# Patient Record
Sex: MALE | Race: WHITE | NOT HISPANIC OR LATINO | Employment: UNEMPLOYED | URBAN - NONMETROPOLITAN AREA
[De-identification: names, ages, dates, MRNs, and addresses within clinical notes are randomized per-mention and may not be internally consistent; named-entity substitution may affect disease eponyms.]

---

## 2024-02-06 ENCOUNTER — HOSPITAL ENCOUNTER (OUTPATIENT)
Dept: GENERAL RADIOLOGY | Facility: HOSPITAL | Age: 34
Discharge: HOME | End: 2024-02-06
Admitting: NURSE PRACTITIONER
Payer: COMMERCIAL

## 2024-02-06 ENCOUNTER — TRANSCRIBE ORDERS (OUTPATIENT)
Dept: GENERAL RADIOLOGY | Facility: HOSPITAL | Age: 34
End: 2024-02-06

## 2024-02-06 DIAGNOSIS — M54.42 ACUTE BACK PAIN WITH SCIATICA, LEFT: Primary | ICD-10-CM

## 2024-02-06 DIAGNOSIS — G89.29 CHRONIC IDIOPATHIC ANAL PAIN: ICD-10-CM

## 2024-02-06 DIAGNOSIS — M54.41 ACUTE BACK PAIN WITH SCIATICA, RIGHT: ICD-10-CM

## 2024-02-06 DIAGNOSIS — M54.42 ACUTE BACK PAIN WITH SCIATICA, LEFT: ICD-10-CM

## 2024-02-06 DIAGNOSIS — K62.89 CHRONIC IDIOPATHIC ANAL PAIN: ICD-10-CM

## 2024-02-06 PROCEDURE — 72100 X-RAY EXAM L-S SPINE 2/3 VWS: CPT

## 2024-04-09 RX ORDER — IBUPROFEN 600 MG/1
TABLET ORAL
COMMUNITY
Start: 2024-01-30 | End: 2024-04-10

## 2024-04-09 RX ORDER — ROPINIROLE 2 MG/1
2 TABLET, FILM COATED, EXTENDED RELEASE ORAL DAILY
COMMUNITY
Start: 2024-02-05 | End: 2024-04-10

## 2024-04-09 RX ORDER — PROMETHAZINE HYDROCHLORIDE 25 MG/1
TABLET ORAL
COMMUNITY
Start: 2024-01-30 | End: 2024-04-10

## 2024-04-09 RX ORDER — CLONIDINE HYDROCHLORIDE 0.1 MG/1
TABLET ORAL
COMMUNITY
Start: 2024-01-30 | End: 2024-04-10

## 2024-04-09 RX ORDER — CHOLECALCIFEROL (VITAMIN D3) 125 MCG
CAPSULE ORAL
COMMUNITY
Start: 2024-01-30 | End: 2024-04-10

## 2024-04-09 RX ORDER — BUPRENORPHINE HYDROCHLORIDE AND NALOXONE HYDROCHLORIDE DIHYDRATE 8; 2 MG/1; MG/1
TABLET SUBLINGUAL
COMMUNITY
Start: 2024-01-31

## 2024-04-09 RX ORDER — METHOCARBAMOL 750 MG/1
TABLET, FILM COATED ORAL
COMMUNITY
Start: 2024-02-05 | End: 2024-04-10

## 2024-04-09 RX ORDER — NAPROXEN 500 MG/1
TABLET ORAL
COMMUNITY
Start: 2024-02-05 | End: 2024-04-10

## 2024-04-09 RX ORDER — SERTRALINE HYDROCHLORIDE 25 MG/1
TABLET, FILM COATED ORAL
COMMUNITY
Start: 2024-02-01 | End: 2024-04-10

## 2024-04-09 RX ORDER — TIZANIDINE 2 MG/1
TABLET ORAL
COMMUNITY
Start: 2024-01-30 | End: 2024-04-10

## 2024-04-09 RX ORDER — HYDROXYZINE HYDROCHLORIDE 25 MG/1
TABLET, FILM COATED ORAL
COMMUNITY
Start: 2024-01-31 | End: 2024-04-10

## 2024-04-10 ENCOUNTER — LAB (OUTPATIENT)
Age: 34
End: 2024-04-10
Payer: COMMERCIAL

## 2024-04-10 ENCOUNTER — OFFICE VISIT (OUTPATIENT)
Age: 34
End: 2024-04-10
Payer: COMMERCIAL

## 2024-04-10 VITALS
OXYGEN SATURATION: 97 % | HEART RATE: 72 BPM | SYSTOLIC BLOOD PRESSURE: 122 MMHG | BODY MASS INDEX: 26.71 KG/M2 | DIASTOLIC BLOOD PRESSURE: 82 MMHG | WEIGHT: 190.8 LBS | HEIGHT: 71 IN

## 2024-04-10 DIAGNOSIS — Z13.29 SCREENING FOR THYROID DISORDER: ICD-10-CM

## 2024-04-10 DIAGNOSIS — Z13.0 SCREENING FOR DEFICIENCY ANEMIA: ICD-10-CM

## 2024-04-10 DIAGNOSIS — Z13.1 SCREENING FOR DIABETES MELLITUS: ICD-10-CM

## 2024-04-10 DIAGNOSIS — E55.9 VITAMIN D DEFICIENCY: ICD-10-CM

## 2024-04-10 DIAGNOSIS — Z00.00 ROUTINE GENERAL MEDICAL EXAMINATION AT A HEALTH CARE FACILITY: ICD-10-CM

## 2024-04-10 DIAGNOSIS — Z13.1 SCREENING FOR DIABETES MELLITUS: Primary | ICD-10-CM

## 2024-04-10 DIAGNOSIS — M79.605 LEFT LEG PAIN: ICD-10-CM

## 2024-04-10 DIAGNOSIS — Z13.220 SCREENING FOR LIPID DISORDERS: ICD-10-CM

## 2024-04-10 DIAGNOSIS — M79.604 PAIN IN BOTH LOWER EXTREMITIES: ICD-10-CM

## 2024-04-10 DIAGNOSIS — E55.9 AVITAMINOSIS D: ICD-10-CM

## 2024-04-10 DIAGNOSIS — M79.604 RIGHT LEG PAIN: ICD-10-CM

## 2024-04-10 DIAGNOSIS — Z11.59 NEED FOR HEPATITIS C SCREENING TEST: ICD-10-CM

## 2024-04-10 DIAGNOSIS — M79.604 PAIN IN BOTH LOWER EXTREMITIES: Primary | ICD-10-CM

## 2024-04-10 DIAGNOSIS — Z13.0 SCREENING FOR IRON DEFICIENCY ANEMIA: ICD-10-CM

## 2024-04-10 DIAGNOSIS — F41.8 ANXIETY WITH DEPRESSION: ICD-10-CM

## 2024-04-10 DIAGNOSIS — M79.605 PAIN IN BOTH LOWER EXTREMITIES: ICD-10-CM

## 2024-04-10 DIAGNOSIS — Z11.59 SCREENING EXAMINATION FOR POLIOMYELITIS: ICD-10-CM

## 2024-04-10 DIAGNOSIS — Z00.00 WELLNESS EXAMINATION: ICD-10-CM

## 2024-04-10 DIAGNOSIS — Z13.220 SCREENING FOR LIPOID DISORDERS: ICD-10-CM

## 2024-04-10 DIAGNOSIS — M79.605 PAIN IN BOTH LOWER EXTREMITIES: Primary | ICD-10-CM

## 2024-04-10 LAB
25(OH)D3 SERPL-MCNC: 31.3 NG/ML (ref 30–100)
ALBUMIN SERPL-MCNC: 4.6 G/DL (ref 3.5–5.2)
ALBUMIN/GLOB SERPL: 1.9 G/DL
ALP SERPL-CCNC: 95 U/L (ref 39–117)
ALT SERPL W P-5'-P-CCNC: 22 U/L (ref 1–41)
ANION GAP SERPL CALCULATED.3IONS-SCNC: 12.5 MMOL/L (ref 5–15)
AST SERPL-CCNC: 15 U/L (ref 1–40)
BILIRUB SERPL-MCNC: 0.2 MG/DL (ref 0–1.2)
BUN SERPL-MCNC: 8 MG/DL (ref 6–20)
BUN/CREAT SERPL: 8.5 (ref 7–25)
CALCIUM SPEC-SCNC: 9.5 MG/DL (ref 8.6–10.5)
CHLORIDE SERPL-SCNC: 106 MMOL/L (ref 98–107)
CHOLEST SERPL-MCNC: 126 MG/DL (ref 0–200)
CHROMATIN AB SERPL-ACNC: <10 IU/ML (ref 0–14)
CO2 SERPL-SCNC: 24.5 MMOL/L (ref 22–29)
CREAT SERPL-MCNC: 0.94 MG/DL (ref 0.76–1.27)
CRP SERPL-MCNC: <0.3 MG/DL (ref 0–0.5)
DEPRECATED RDW RBC AUTO: 45.1 FL (ref 37–54)
EGFRCR SERPLBLD CKD-EPI 2021: 110.5 ML/MIN/1.73
ERYTHROCYTE [DISTWIDTH] IN BLOOD BY AUTOMATED COUNT: 12.7 % (ref 12.3–15.4)
ERYTHROCYTE [SEDIMENTATION RATE] IN BLOOD: 4 MM/HR (ref 0–15)
GLOBULIN UR ELPH-MCNC: 2.4 GM/DL
GLUCOSE SERPL-MCNC: 80 MG/DL (ref 65–99)
HCT VFR BLD AUTO: 48.9 % (ref 37.5–51)
HCV AB SER DONR QL: NORMAL
HDLC SERPL-MCNC: 40 MG/DL (ref 40–60)
HGB BLD-MCNC: 16.4 G/DL (ref 13–17.7)
LDLC SERPL CALC-MCNC: 65 MG/DL (ref 0–100)
LDLC/HDLC SERPL: 1.58 {RATIO}
MCH RBC QN AUTO: 32.3 PG (ref 26.6–33)
MCHC RBC AUTO-ENTMCNC: 33.5 G/DL (ref 31.5–35.7)
MCV RBC AUTO: 96.3 FL (ref 79–97)
PLATELET # BLD AUTO: 248 10*3/MM3 (ref 140–450)
PMV BLD AUTO: 11 FL (ref 6–12)
POTASSIUM SERPL-SCNC: 4.3 MMOL/L (ref 3.5–5.2)
PROT SERPL-MCNC: 7 G/DL (ref 6–8.5)
RBC # BLD AUTO: 5.08 10*6/MM3 (ref 4.14–5.8)
SODIUM SERPL-SCNC: 143 MMOL/L (ref 136–145)
TRIGL SERPL-MCNC: 115 MG/DL (ref 0–150)
TSH SERPL DL<=0.05 MIU/L-ACNC: 1.4 UIU/ML (ref 0.27–4.2)
URATE SERPL-MCNC: 5.4 MG/DL (ref 3.4–7)
VLDLC SERPL-MCNC: 21 MG/DL (ref 5–40)
WBC NRBC COR # BLD AUTO: 11.65 10*3/MM3 (ref 3.4–10.8)

## 2024-04-10 PROCEDURE — 85652 RBC SED RATE AUTOMATED: CPT | Performed by: NURSE PRACTITIONER

## 2024-04-10 PROCEDURE — 86140 C-REACTIVE PROTEIN: CPT | Performed by: NURSE PRACTITIONER

## 2024-04-10 PROCEDURE — 80050 GENERAL HEALTH PANEL: CPT | Performed by: NURSE PRACTITIONER

## 2024-04-10 PROCEDURE — 86431 RHEUMATOID FACTOR QUANT: CPT | Performed by: NURSE PRACTITIONER

## 2024-04-10 PROCEDURE — 36415 COLL VENOUS BLD VENIPUNCTURE: CPT | Performed by: NURSE PRACTITIONER

## 2024-04-10 PROCEDURE — 84550 ASSAY OF BLOOD/URIC ACID: CPT | Performed by: NURSE PRACTITIONER

## 2024-04-10 PROCEDURE — 82306 VITAMIN D 25 HYDROXY: CPT | Performed by: NURSE PRACTITIONER

## 2024-04-10 PROCEDURE — 80061 LIPID PANEL: CPT | Performed by: NURSE PRACTITIONER

## 2024-04-10 PROCEDURE — 86803 HEPATITIS C AB TEST: CPT | Performed by: NURSE PRACTITIONER

## 2024-04-10 RX ORDER — DESVENLAFAXINE 25 MG/1
25 TABLET, EXTENDED RELEASE ORAL DAILY
Qty: 30 TABLET | Refills: 0 | Status: SHIPPED | OUTPATIENT
Start: 2024-04-10

## 2024-04-10 RX ORDER — DESVENLAFAXINE 25 MG/1
25 TABLET, EXTENDED RELEASE ORAL DAILY
COMMUNITY
Start: 2024-02-09 | End: 2024-04-10 | Stop reason: SDUPTHER

## 2024-04-10 NOTE — PROGRESS NOTES
"Chief Complaint   Patient presents with    Leg Pain     Bilateral, stabbing pain, burning pain. At worse 8/10 on pain scale. Sitting makes it worse    Anxiety    Depression       Subjective   Shaggy Kapoor is a 33 y.o. 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 He has been having some bilateral leg pain that goes from his hips down to his feet and some numbness he also has back pain he denies any injuries or accidents that would cause the issues. He also complains of some restless leg issues at night and would like medicine to help with that.     4/2/2024- [facility] ER visit- Patient presents emergency department stating that for the past 1 year off-and-on he has had bilateral lower extremity pain described as \"growing pains, \"as well as pins-and-needles. Patient denies fever vomiting diarrhea dysuria saddle anesthesia, denies numbness in lower extremities, denies trauma or bowel or urinary incontinence, remains ambulatory, denies taking any medications today. Patient is requesting note for school today. Patient smokes but denies alcohol or recreational drugs, denies past medical surgical history or allergies to medicines. Patient states that pain is descending and stops around the ankle.     4/10/2024- Patient in to establish care, Patient denies any injury, has back pain, and has had this for couple years.  He did [facility] " above and was given muscle relaxers, naproxen and nothing helped.  He also was given some ropinirole for restless legs and this did not help either.  He also does have anxiety/depression issues.  He has been out of his medications for this for about a month.  Pristiq 25 mg.  He reports this did help some. Has a history of alcohol and drug issues, Has been clean about 6 months.     No Known Allergies  Past Medical History:   Diagnosis Date    Anxiety     Depression       History reviewed. No pertinent surgical history.  Social History     Socioeconomic History    Marital status: Single   Tobacco Use    Smoking status: Every Day     Current packs/day: 1.00     Average packs/day: 1 pack/day for 7.3 years (7.3 ttl pk-yrs)     Types: Cigarettes     Start date: 2017    Smokeless tobacco: Former     Types: Chew   Vaping Use    Vaping status: Never Used   Substance and Sexual Activity    Alcohol use: Not Currently    Drug use: Not Currently     Types: Methamphetamines    Sexual activity: Not Currently     Partners: Female        Current Outpatient Medications:     buprenorphine-naloxone (SUBOXONE) 8-2 MG per SL tablet, , Disp: , Rfl:     Desvenlafaxine Succinate ER 25 MG tablet sustained-release 24 hour, Take 1 tablet by mouth Daily., Disp: 30 tablet, Rfl: 0     Review of Systems   Constitutional:  Negative for chills, fatigue, fever and unexpected weight change.   Respiratory:  Negative for cough, chest tightness, shortness of breath and wheezing.    Cardiovascular:  Negative for chest pain, palpitations and leg swelling.   Gastrointestinal:  Negative for constipation, diarrhea, nausea and vomiting.   Genitourinary:  Negative for difficulty urinating and dysuria.   Musculoskeletal:  Positive for arthralgias (legs).   Skin:  Negative for color change and rash.   Neurological:  Positive for numbness. Negative for dizziness, syncope, weakness and headaches.   Psychiatric/Behavioral:  Negative for sleep disturbance.         Objective     Vitals:    04/10/24 0903   BP: 122/82   Pulse: 72   SpO2: 97%         04/10/24  0903   Weight: 86.5 kg (190 lb 12.8 oz)     Body mass index is 26.61 kg/m².  No results found for this or any previous visit.    Physical Exam  Vitals reviewed.   Constitutional:       Appearance: He is well-developed.   HENT:      Head: Normocephalic and atraumatic.   Cardiovascular:      Rate and Rhythm: Normal rate and regular rhythm.      Heart sounds: Normal heart sounds.   Pulmonary:      Effort: Pulmonary effort is normal.      Breath sounds: Normal breath sounds.   Musculoskeletal:         General: Tenderness (more in right lower leg than left, present in bilat) present.   Skin:     General: Skin is warm and dry.   Neurological:      Mental Status: He is alert and oriented to person, place, and time.      Sensory: No sensory deficit.      Motor: No weakness.      Deep Tendon Reflexes: Reflexes normal.   Psychiatric:         Behavior: Behavior normal.         Assessment & Plan   Problems Addressed this Visit    None  Visit Diagnoses       Pain in both lower extremities    -  Primary    Relevant Orders    XR Spine Lumbar 4+ View (In Office)    EMG & Nerve Conduction Test    C-reactive Protein    Rheumatoid Factor    Uric Acid    Sedimentation Rate    Anxiety with depression        Relevant Medications    Desvenlafaxine Succinate ER 25 MG tablet sustained-release 24 hour    Other Relevant Orders    Ambulatory Referral to Psychiatry    Screening for diabetes mellitus        Relevant Orders    Comprehensive Metabolic Panel    Screening for lipid disorders        Relevant Orders    Lipid Panel    Need for hepatitis C screening test        Relevant Orders    Hepatitis C Antibody    Vitamin D deficiency        Relevant Orders    Vitamin D,25-Hydroxy    Screening for thyroid disorder        Relevant Orders    TSH Rfx On Abnormal To Free T4    Screening for deficiency anemia        Relevant Orders    CBC (No Diff)     Wellness examination        Relevant Orders    Comprehensive Metabolic Panel    Lipid Panel    TSH Rfx On Abnormal To Free T4    CBC (No Diff)    Hepatitis C Antibody    C-reactive Protein    Rheumatoid Factor    Uric Acid    Sedimentation Rate          Diagnoses         Codes Comments    Pain in both lower extremities    -  Primary ICD-10-CM: M79.604, M79.605  ICD-9-CM: 729.5     Anxiety with depression     ICD-10-CM: F41.8  ICD-9-CM: 300.4     Screening for diabetes mellitus     ICD-10-CM: Z13.1  ICD-9-CM: V77.1     Screening for lipid disorders     ICD-10-CM: Z13.220  ICD-9-CM: V77.91     Need for hepatitis C screening test     ICD-10-CM: Z11.59  ICD-9-CM: V73.89     Vitamin D deficiency     ICD-10-CM: E55.9  ICD-9-CM: 268.9     Screening for thyroid disorder     ICD-10-CM: Z13.29  ICD-9-CM: V77.0     Screening for deficiency anemia     ICD-10-CM: Z13.0  ICD-9-CM: V78.1     Wellness examination     ICD-10-CM: Z00.00  ICD-9-CM: V70.0         Will obtain routine labs today and make further recommendations pending results. All lab results are automatically released to [employer] immediately when they return whether they have been reviewed or not. The patient will be notified about the results, regardless of the findings. If they have not been contacted by the office within 2 weeks after the test has been performed, I want them to contact us to learn about the results.    For his current symptoms we will obtain labs, x-ray of his lower back as well as obtain an EMG.  I will make further recommendations pending outcome of this test.    For depression/anxiety we will put him back on Pristiq 25 mg daily for 1 month and then we will increase it to 50 mg daily.    I, Lanre BRITO, personally performed the services described in this documentation, as scribed by ALISHA Hughes student, in my presence, and is both accurate and complete.       Time spent on visit, including counseling, education, reviewing the chart,  and any recent test results, was   30     Minutes    Return visit in 1 month    Counseling provided on mental health concerns.    Lanre Brooke Pratik, APRN   4/10/2024   09:36 EDT     Please note that portions of this document were completed with a voice recognition program.     At [facility], we believe that sharing information builds trust and better relationships. You are receiving this note because you are receiving care at [facility] or have recently visited. It is possible you will see health information before a provider has talked with you about it. This kind of information can be easy to misunderstand as it is a medical document. It is intended as [id number] communication. It is written in medical language and may contain abbreviations or verbiage that are unfamiliar. It may appear blunt or direct. Medical documents are intended to carry relevant information, facts as evident, and the clinical opinion of the practitioner.  To help you fully understand what it means for your health, we urge you to discuss this note with your provider.

## 2024-04-16 ENCOUNTER — TELEMEDICINE (OUTPATIENT)
Dept: FAMILY MEDICINE CLINIC | Facility: TELEHEALTH | Age: 34
End: 2024-04-16
Payer: COMMERCIAL

## 2024-04-16 DIAGNOSIS — K52.9 GASTROENTERITIS: Primary | ICD-10-CM

## 2024-04-16 PROCEDURE — 99213 OFFICE O/P EST LOW 20 MIN: CPT | Performed by: NURSE PRACTITIONER

## 2024-04-16 PROCEDURE — 1160F RVW MEDS BY RX/DR IN RCRD: CPT | Performed by: NURSE PRACTITIONER

## 2024-04-16 PROCEDURE — 1159F MED LIST DOCD IN RCRD: CPT | Performed by: NURSE PRACTITIONER

## 2024-04-16 RX ORDER — ONDANSETRON 8 MG/1
8 TABLET, ORALLY DISINTEGRATING ORAL EVERY 8 HOURS PRN
Qty: 15 TABLET | Refills: 0 | Status: SHIPPED | OUTPATIENT
Start: 2024-04-16

## 2024-04-16 NOTE — PROGRESS NOTES
NEO Kapoor is a 33 y.o. male  presents with complaint of symptoms starting this morning including N/V/D and body aches. Currently lives in sober living home and it has been going around in other residents. Needs excuse for class for today and tomorrow. Has been able to drink Sprite but has not had an appetite today.    Review of Systems    Past Medical History:   Diagnosis Date    Anxiety     Depression        Family History   Problem Relation Age of Onset    Hypertension Mother     Hypertension Maternal Grandfather        Social History     Socioeconomic History    Marital status: Single   Tobacco Use    Smoking status: Every Day     Current packs/day: 1.00     Average packs/day: 1 pack/day for 7.3 years (7.3 ttl pk-yrs)     Types: Cigarettes     Start date: 2017    Smokeless tobacco: Former     Types: Chew   Vaping Use    Vaping status: Never Used   Substance and Sexual Activity    Alcohol use: Not Currently    Drug use: Not Currently     Types: Methamphetamines    Sexual activity: Not Currently     Partners: Female         There were no vitals taken for this visit.    PHYSICAL EXAM  Physical Exam   Constitutional: He appears well-developed and well-nourished.   HENT:   Head: Normocephalic.   Nose: Nose normal.   Neck: Neck normal appearance.  Pulmonary/Chest: Effort normal.   Neurological: He is alert.   Psychiatric: He has a normal mood and affect. His speech is normal.       Diagnoses and all orders for this visit:    1. Gastroenteritis (Primary)  -     ondansetron ODT (ZOFRAN-ODT) 8 MG disintegrating tablet; Place 1 tablet on the tongue Every 8 (Eight) Hours As Needed for Nausea or Vomiting.  Dispense: 15 tablet; Refill: 0          FOLLOW-UP  As discussed during visit with [facility] if symptoms worsen or fail to improve, follow-up with PCP/Urgent Care/Emergency Department.    Patient verbalizes understanding of medications, instructions for treatment and follow-up.    Avril Angel,  ALISHA  04/16/2024  13:02 EDT    The use of a video visit has been reviewed with the patient and verbal informed consent has been obtained. Myself and Shaggy Kapoor participated in this visit. The patient is located in [place], [place], and I am located in [place], [place]. [employer] and [employer] were utilized.

## 2024-04-16 NOTE — LETTER
April 16, 2024     Patient: Shaggy Kapoor   YOB: 1990   Date of Visit: 4/16/2024       To Whom It May Concern:    It is my medical opinion that Shaggy Kapoor  should be excused from class today and tomorrow .           Sincerely,        ALISHA Mojica    CC:   No Recipients

## 2024-04-23 ENCOUNTER — TELEMEDICINE (OUTPATIENT)
Dept: PSYCHIATRY | Facility: CLINIC | Age: 34
End: 2024-04-23
Payer: COMMERCIAL

## 2024-04-23 DIAGNOSIS — F41.1 GENERALIZED ANXIETY DISORDER: ICD-10-CM

## 2024-04-23 DIAGNOSIS — F33.1 MODERATE EPISODE OF RECURRENT MAJOR DEPRESSIVE DISORDER: Primary | ICD-10-CM

## 2024-04-23 DIAGNOSIS — F51.05 INSOMNIA DUE TO MENTAL CONDITION: ICD-10-CM

## 2024-04-23 PROCEDURE — 1160F RVW MEDS BY RX/DR IN RCRD: CPT

## 2024-04-23 PROCEDURE — 90792 PSYCH DIAG EVAL W/MED SRVCS: CPT

## 2024-04-23 PROCEDURE — 1159F MED LIST DOCD IN RCRD: CPT

## 2024-04-23 RX ORDER — DESVENLAFAXINE SUCCINATE 50 MG/1
50 TABLET, EXTENDED RELEASE ORAL DAILY
Qty: 30 TABLET | Refills: 0 | Status: SHIPPED | OUTPATIENT
Start: 2024-04-23

## 2024-04-23 RX ORDER — MIRTAZAPINE 15 MG/1
15 TABLET, FILM COATED ORAL NIGHTLY
Qty: 30 TABLET | Refills: 0 | Status: SHIPPED | OUTPATIENT
Start: 2024-04-23

## 2024-04-23 NOTE — LETTER
[facility]  [address] [place] [address]  [phone]  Dept: [phone]    24    RE:   Patient Name:  Shaggy Kapoor   :  1990    To Whom It May Concern    Please excuse Shaggy from group this AM.                                                  He had a medical appointment at 9AM 2024.                         He is released to return  2024.                                                                         To be re-evaluated on: May 22nd, 2024.    If you have any questions please contact us at [phone].    Sincerely,        This document has been electronically signed by ALISHA Bunch  2024 09:39 EDT

## 2024-04-23 NOTE — PROGRESS NOTES
This provider is located at [place], [place]. The Patient is seen remotely using Video. Patient is being seen via telehealth and confirm that they are in a secure environment for this session. Patient is located in [place], [place] at his sober living facility. The patient's condition being diagnosed/treated is appropriate for telemedicine. Provider identified as Kodi Alejo as well as credentials APRN MSN PMHNP-BC.   The client/patient gave consent to be seen remotely, and when consent is given they understand that the consent allows for patient identifiable information to be sent to a third party as needed.  They may refuse to be seen remotely at any time. The electronic data is encrypted and password protected, and the patient has been advised of the potential risks to privacy not withstanding such measures.    Subjective     Shaggy Kapoor is a 33 y.o. male who presents today for initial evaluation     Chief Complaint: Depression, anxiety, and insomnia    History of Present Illness: This is the first encounter for this APRN with the patient.  Patient is a referral for evaluation and management of his depression, anxiety, and insomnia.  Patient reports that he has a history of abusing methamphetamine.  States he went into rehab in January and that is when he was diagnosed with depression and anxiety.  He states that he is now in a sober living facility in [place], [place].  States he has been sober for 3 to 4 weeks now.  States he felt like he has had depression and anxiety for many years, but had never sought any type of treatment.  He denies any history of any psychiatric hospitalizations.  Denies any history of suicide attempts.  Patient states that he is maternal grandfather, mother, father, and a brother all have been diagnosed with schizophrenia and bipolar disorder.  Patient has concerns that he may have also.  He states his drug of choice was methamphetamine.  States he has experienced hearing voices  in the past.  He is unsure if this is just from the methamphetamine or actual hallucinations from a mental health disorder.  Patient was asked about any history of any hypomania.  The patient states there had been times when he would get some big ideas, would fight people, and steal things even though he did not need them in the past.  Again, patient is unable to discern if this was while on drugs or not.  Made a plan with patient that we will hold off on any schizophrenia or bipolar diagnoses currently until he is sober for a longer period of time and also we get his depression and anxiety under control.  Patient agreeable to the plan.  Patient states he was started on Pristiq about a week ago.  He states he has not noticed much difference in his symptoms yet.  He states that he will feel depressed about 3 to 4 days/week currently.  Denies any hopelessness.  Denies any suicidal or homicidal ideation.  States his sleep is poor.  Only getting 2 to 3 hours of sleep per night.  He states some of this is from being in the sober living facility and new surroundings.  Appetite is up and down.  He states he has a lot of anxiety.  States he has a lot of worry, overthinking, and catastrophic Thinking.  He states crowds do increase his anxiety.  He will get short of breath and having increased heart rate when this happens.  He denies any paranoia.  States he is no longer taking the Suboxone as he did not want to be dependent on that.    The following portions of the patient's history were reviewed and updated as appropriate: allergies, current medications, past family history, past medical history, past social history, past surgical history and problem list.    Past Psychiatric History: See HPI for treatment history.  Denies any history of inpatient psychiatric hospitalizations.  Denies any history of suicide attempts.    Family Psychiatric History: Mother and father were drug addicts when patient was growing up.  Mother,  father, maternal grandfather, and brother all have schizophrenia and bipolar disorder.  No suicides among first-degree relatives.    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  He has also used cocaine, crack, and different types of pills over the years.  He states he was drinking alcohol in excess a few years ago up to 1/5 daily, but has not done that in a few years.  He has been clean since March 27.  Denies any marijuana use.    Past Medical History:  Past Medical History:   Diagnosis Date    Anxiety     Depression        Social History: Patient was born and raised in [place], [place].  He was raised by his mother and father even though they were drug addicts.  He denies any history of abuse.  He has 2 brothers and 1 sister.  He has a GED.  He is currently .  He has 3 kids by 3 different women.  He does have 3 assault charges ongoing currently.  Hobbies include video games, spending time with his kids, sports, and camping.  He is currently seeking employment.  Social History     Socioeconomic History    Marital status: Single   Tobacco Use    Smoking status: Every Day     Current packs/day: 1.00     Average packs/day: 1 pack/day for 7.3 years (7.3 ttl pk-yrs)     Types: Cigarettes     Start date: 2017    Smokeless tobacco: Former     Types: Chew   Vaping Use    Vaping status: Never Used   Substance and Sexual Activity    Alcohol use: Not Currently    Drug use: Not Currently     Types: Methamphetamines    Sexual activity: Not Currently     Partners: Female       Family History:  Family History   Problem Relation Age of Onset    Hypertension Mother     Hypertension Maternal Grandfather        Past Surgical History:  History reviewed. No pertinent surgical history.    Problem List:  Patient Active Problem List   Diagnosis    Moderate episode of recurrent major depressive disorder    Generalized anxiety disorder    Insomnia due to mental condition       Allergy:   No Known  Allergies     Current Medications:   Current Outpatient Medications   Medication Sig Dispense Refill    Desvenlafaxine Succinate ER (PRISTIQ) 50 MG 24 hr tablet Take 1 tablet by mouth Daily. 30 tablet 0    mirtazapine (Remeron) 15 MG tablet Take 1 tablet by mouth Every Night. 30 tablet 0     No current facility-administered medications for this visit.       Review of Symptoms:    Review of Systems   Constitutional: Negative.    HENT: Negative.     Eyes: Negative.    Respiratory: Negative.     Cardiovascular: Negative.    Gastrointestinal: Negative.    Endocrine: Negative.    Genitourinary: Negative.    Musculoskeletal: Negative.    Skin: Negative.    Allergic/Immunologic: Negative.    Neurological: Negative.    Hematological: Negative.    Psychiatric/Behavioral:  Positive for sleep disturbance and depressed mood. The patient is nervous/anxious.          Physical Exam:   There were no vitals taken for this visit.    Appearance: Normal  Gait, Station, Strength: Within normal limits    Mental Status Exam:   Hygiene:   good  Cooperation:  Cooperative  Eye Contact:  Good  Psychomotor Behavior:  Appropriate  Affect:  Appropriate  Mood: depressed and anxious  Hopelessness: Denies  Speech:  Normal  Thought Process:  Goal directed  Thought Content:  Normal  Suicidal:  None  Homicidal:  None  Hallucinations:  None  Delusion:  None  Memory:  Intact  Orientation:  Person, Place, Time, and Situation  Reliability:  good  Insight:  Good  Judgement:  Good  Impulse Control:  Good    PHQ-9 Depression Screening  Little interest or pleasure in doing things? (P) 1-->several days   Feeling down, depressed, or hopeless? (P) 1-->several days   Trouble falling or staying asleep, or sleeping too much? (P) 3-->nearly every day   Feeling tired or having little energy? (P) 3-->nearly every day   Poor appetite or overeating? (P) 0-->not at all   Feeling bad about yourself - or that you are a failure or have let yourself or your family down? (P)  1-->several days   Trouble concentrating on things, such as reading the newspaper or watching television? (P) 3-->nearly every day   Moving or speaking so slowly that other people could have noticed? Or the opposite - being so fidgety or restless that you have been moving around a lot more than usual? (P) 0-->not at all   Thoughts that you would be better off dead, or of hurting yourself in some way? (P) 0-->not at all   PHQ-9 Total Score (P) 12   If you checked off any problems, how difficult have these problems made it for you to do your work, take care of things at home, or get along with other people? (P) somewhat difficult        PHQ-9 Total Score: (P) 12     TAMMY 7 anxiety screening tool that patient filled out virtually reviewed by this APRN at today's encounter.    PROMIS scale screening tool that patient filled out virtually reviewed by this APRN at today's encounter.    [facility] request number [id number] reviewed by this APRN at today's encounter.    Previous Provider notes and available records reviewed by this APRN today.     Lab Results:   Orders Only on 04/10/2024   Component Date Value Ref Range Status    Glucose 04/10/2024 80  65 - 99 mg/dL Final    BUN 04/10/2024 8  6 - 20 mg/dL Final    Creatinine 04/10/2024 0.94  0.76 - 1.27 mg/dL Final    Sodium 04/10/2024 143  136 - 145 mmol/L Final    Potassium 04/10/2024 4.3  3.5 - 5.2 mmol/L Final    Chloride 04/10/2024 106  98 - 107 mmol/L Final    CO2 04/10/2024 24.5  22.0 - 29.0 mmol/L Final    Calcium 04/10/2024 9.5  8.6 - 10.5 mg/dL Final    Total Protein 04/10/2024 7.0  6.0 - 8.5 g/dL Final    Albumin 04/10/2024 4.6  3.5 - 5.2 g/dL Final    ALT (SGPT) 04/10/2024 22  1 - 41 U/L Final    AST (SGOT) 04/10/2024 15  1 - 40 U/L Final    Alkaline Phosphatase 04/10/2024 95  39 - 117 U/L Final    Total Bilirubin 04/10/2024 0.2  0.0 - 1.2 mg/dL Final    Globulin 04/10/2024 2.4  gm/dL Final    A/G Ratio 04/10/2024 1.9  g/dL Final    BUN/Creatinine Ratio 04/10/2024 8.5   7.0 - 25.0 Final    Anion Gap 04/10/2024 12.5  5.0 - 15.0 mmol/L Final    eGFR 04/10/2024 110.5  >60.0 mL/min/1.73 Final    Total Cholesterol 04/10/2024 126  0 - 200 mg/dL Final    Triglycerides 04/10/2024 115  0 - 150 mg/dL Final    HDL Cholesterol 04/10/2024 40  40 - 60 mg/dL Final    LDL Cholesterol  04/10/2024 65  0 - 100 mg/dL Final    VLDL Cholesterol 04/10/2024 21  5 - 40 mg/dL Final    LDL/HDL Ratio 04/10/2024 1.58   Final    25 Hydroxy, Vitamin D 04/10/2024 31.3  30.0 - 100.0 ng/ml Final    TSH 04/10/2024 1.400  0.270 - 4.200 uIU/mL Final    WBC 04/10/2024 11.65 (H)  3.40 - 10.80 10*3/mm3 Final    RBC 04/10/2024 5.08  4.14 - 5.80 10*6/mm3 Final    Hemoglobin 04/10/2024 16.4  13.0 - 17.7 g/dL Final    Hematocrit 04/10/2024 48.9  37.5 - 51.0 % Final    MCV 04/10/2024 96.3  79.0 - 97.0 fL Final    MCH 04/10/2024 32.3  26.6 - 33.0 pg Final    MCHC 04/10/2024 33.5  31.5 - 35.7 g/dL Final    RDW 04/10/2024 12.7  12.3 - 15.4 % Final    RDW-SD 04/10/2024 45.1  37.0 - 54.0 fl Final    MPV 04/10/2024 11.0  6.0 - 12.0 fL Final    Platelets 04/10/2024 248  140 - 450 10*3/mm3 Final    Hepatitis C Ab 04/10/2024 Non-Reactive  Non-Reactive Final    C-Reactive Protein 04/10/2024 <0.30  0.00 - 0.50 mg/dL Final    Rheumatoid Factor Quantitative 04/10/2024 <10.0  0.0 - 14.0 IU/mL Final    Uric Acid 04/10/2024 5.4  3.4 - 7.0 mg/dL Final    Sed Rate 04/10/2024 4  0 - 15 mm/hr Final       Assessment & Plan   Problems Addressed this Visit          Mental Health    Moderate episode of recurrent major depressive disorder - Primary    Relevant Medications    Desvenlafaxine Succinate ER (PRISTIQ) 50 MG 24 hr tablet    mirtazapine (Remeron) 15 MG tablet    Generalized anxiety disorder    Relevant Medications    Desvenlafaxine Succinate ER (PRISTIQ) 50 MG 24 hr tablet    mirtazapine (Remeron) 15 MG tablet    Insomnia due to mental condition    Relevant Medications    Desvenlafaxine Succinate ER (PRISTIQ) 50 MG 24 hr tablet     mirtazapine (Remeron) 15 MG tablet     Diagnoses         Codes Comments    Moderate episode of recurrent major depressive disorder    -  Primary ICD-10-CM: F33.1  ICD-9-CM: 296.32     Generalized anxiety disorder     ICD-10-CM: F41.1  ICD-9-CM: 300.02     Insomnia due to mental condition     ICD-10-CM: F51.05  ICD-9-CM: 300.9, 327.02             Visit Diagnoses:    ICD-10-CM ICD-9-CM   1. Moderate episode of recurrent major depressive disorder  F33.1 296.32   2. Generalized anxiety disorder  F41.1 300.02   3. Insomnia due to mental condition  F51.05 300.9     327.02     Discussed treatment options with patient.  Discussed with patient that currently we will keep his diagnosis is major depression and generalized anxiety disorder.  We will also add insomnia.  Discussed with patient that we will watch his symptoms ongoing to see if there is any indication of a bipolar disorder or schizophrenia.  Patient agreeable.  Discussed medication options with patient.  Discussed with patient that we can increase his Pristiq to further target his depression and anxiety symptoms.  Patient agreeable.  Encouraged patient to watch for any symptoms or signs of hypomania with the medication.  Patient verbalized understanding and agreed.  Increase Pristiq to 50 mg daily for depression and anxiety.  Discussed Remeron with patient to help with his sleep.  Discussed that Remeron is more sedating at lower doses, so he should take the entire dose.  If the full dose of the 50 mg does not help him sleep, the next night he can break it in half.  Patient verbalized understanding and states he would like to try the medication.  Start Remeron 15 mg nightly for sleep.  Patient has an appointment next month with Edwardo Woodward LCSW for therapy.  Will see patient again in 4 weeks to reassess.  Encouraged patient to contact the office if he has any issues sooner.    TREATMENT PLAN/GOALS: Continue supportive psychotherapy efforts and medications as  indicated. Treatment and medication options discussed during today's visit. Patient acknowledged and verbally consented to continue with current treatment plan and was educated on the importance of compliance with treatment and follow-up appointments.    Short Term Goals: Patient will be compliant with medication, and patient will have no significant medication related side effects.  Patient will be engaged in psychotherapy as indicated.  Patient will report subjective improvement of symptoms.    Long term goals: To stabilize mood and treat/improve subjective symptoms, the patient will stay out of the hospital, the patient will be at an optimal level of functioning, and the patient will take all medications as prescribed.  The patient verbalized understanding and agreement with goals that were mutually set.    MEDICATION ISSUES:    Discussed medication options and treatment plan of prescribed medication as well as the risks, benefits, and side effects including potential falls, possible impaired driving and metabolic adversities among others. Patient is agreeable to call the office with any worsening of symptoms or onset of side effects. Patient is agreeable to call 911 or go to the nearest ER should he/she begin having SI/HI. If patient has any concerns or needs assistance they were instructed to call the [facility] at [phone].    MEDS ORDERED DURING VISIT:  New Medications Ordered This Visit   Medications    Desvenlafaxine Succinate ER (PRISTIQ) 50 MG 24 hr tablet     Sig: Take 1 tablet by mouth Daily.     Dispense:  30 tablet     Refill:  0    mirtazapine (Remeron) 15 MG tablet     Sig: Take 1 tablet by mouth Every Night.     Dispense:  30 tablet     Refill:  0       Return in about 4 weeks (around 5/21/2024) for Video visit.             This document has been electronically signed by ALISHA Bunch  April 23, 2024 11:00 EDT    Part of this note may be an electronic  transmission of spoken language to printed text using the Dragon Dictation System.

## 2024-05-10 ENCOUNTER — TELEMEDICINE (OUTPATIENT)
Dept: FAMILY MEDICINE CLINIC | Facility: TELEHEALTH | Age: 34
End: 2024-05-10
Payer: COMMERCIAL

## 2024-05-10 DIAGNOSIS — J30.9 ALLERGIC RHINITIS, UNSPECIFIED SEASONALITY, UNSPECIFIED TRIGGER: Primary | ICD-10-CM

## 2024-05-10 DIAGNOSIS — M25.562 ARTHRALGIA OF BOTH LOWER LEGS: ICD-10-CM

## 2024-05-10 DIAGNOSIS — M25.561 ARTHRALGIA OF BOTH LOWER LEGS: ICD-10-CM

## 2024-05-10 RX ORDER — IBUPROFEN 800 MG/1
800 TABLET ORAL EVERY 6 HOURS PRN
Qty: 40 TABLET | Refills: 0 | Status: SHIPPED | OUTPATIENT
Start: 2024-05-10